# Patient Record
Sex: FEMALE | Race: WHITE | NOT HISPANIC OR LATINO | Employment: OTHER | URBAN - METROPOLITAN AREA
[De-identification: names, ages, dates, MRNs, and addresses within clinical notes are randomized per-mention and may not be internally consistent; named-entity substitution may affect disease eponyms.]

---

## 2018-09-20 ENCOUNTER — OFFICE VISIT (OUTPATIENT)
Dept: URGENT CARE | Facility: CLINIC | Age: 68
End: 2018-09-20
Payer: MEDICARE

## 2018-09-20 VITALS
OXYGEN SATURATION: 98 % | BODY MASS INDEX: 29.08 KG/M2 | SYSTOLIC BLOOD PRESSURE: 165 MMHG | HEART RATE: 79 BPM | DIASTOLIC BLOOD PRESSURE: 79 MMHG | RESPIRATION RATE: 18 BRPM | HEIGHT: 62 IN | WEIGHT: 158 LBS | TEMPERATURE: 97 F

## 2018-09-20 DIAGNOSIS — S52.501A CLOSED FRACTURE OF DISTAL END OF RIGHT RADIUS, UNSPECIFIED FRACTURE MORPHOLOGY, INITIAL ENCOUNTER: Primary | ICD-10-CM

## 2018-09-20 DIAGNOSIS — S52.614A CLOSED NONDISPLACED FRACTURE OF STYLOID PROCESS OF RIGHT ULNA, INITIAL ENCOUNTER: ICD-10-CM

## 2018-09-20 PROCEDURE — 99203 OFFICE O/P NEW LOW 30 MIN: CPT | Mod: S$GLB,,, | Performed by: NURSE PRACTITIONER

## 2018-09-20 RX ORDER — BUPROPION HYDROCHLORIDE 150 MG/1
150 TABLET ORAL DAILY
COMMUNITY

## 2018-09-20 RX ORDER — NAPROXEN 500 MG/1
500 TABLET ORAL 2 TIMES DAILY WITH MEALS
Qty: 20 TABLET | Refills: 0 | Status: SHIPPED | OUTPATIENT
Start: 2018-09-20 | End: 2018-09-30

## 2018-09-20 NOTE — PATIENT INSTRUCTIONS
Upper Extremity Fracture    You have a break (fracture) of the arm, wrist, or hand. This may be a small crack in the bone. Or it may be a major break, with the broken parts pushed out of position. Most fractures will heal without surgery. But you may need surgery if the bones are far out of place or if the break is near the elbow. Treatment is with a special sling called a shoulder immobilizer, or a splint or cast, depending on the type of fracture and where the fracture is. This fracture takes 4 to 6 weeks or longer to heal. The cast may need to be changed in 2 to 3 weeks as swelling goes down.  Home care  Follow these guidelines when caring for yourself:  · If you were given a shoulder immobilizer, leave it in place. This will support the injured arm at your side. This is the best position for bone healing. The shoulder immobilizer can be adjusted. If it becomes loose, adjust it so that your forearm is level with the ground (horizontal). Your hand should be level with your elbow.  · Put an ice pack on the injured area. Do this for 20 minutes every 1 to 2 hours the first day for pain relief. You can make an ice pack by wrapping a plastic bag of ice cubes in a thin towel. As the ice melts, be careful that the cast/splint/sling doesnt get wet. You can put the ice pack inside the sling and directly over the splint or cast. Continue using the ice pack 3 to 4 times a day for the next 2 days. Then use the ice pack as needed to ease pain and swelling.  · Keep the cast, splint, or sling completely dry at all times. Bathe with your cast, splint, or sling out of the water. Protect it with a large plastic bag, rubber-banded at the top end. If a fiberglass cast, splint, or sling gets wet, you can dry it with a hair dryer.  · You may use acetaminophen or ibuprofen to control pain, unless another pain medicine was prescribed. If you have chronic liver or kidney disease, talk with your healthcare provider before using these  medicines. Also talk with your provider if youve had a stomach ulcer or gastrointestinal bleeding.  · Dont put creams or objects under the cast if you have itching.  Follow-up care  Follow up with your healthcare provider, or as advised. This is to make sure the bone is healing the way it should.  X-rays may be taken. You will be told of any new findings that may affect your care.  When to seek medical advice  Call your health care provider right away if any of these occur:  · The cast or splint cracks  · The plaster cast or splint becomes wet or soft  · The fiberglass cast or splint stays wet for more than 24 hours  · Bad odor from the cast or wound fluid stains the cast  · Tightness or pain under the cast or splint gets worse  · Fingers become swollen, cold, blue, numb, or tingly  · You cant move your fingers  · Skin around cast or splint becomes red  · Fever of 100.4ºF (38ºC) or higher, or as directed by your healthcare provider  Date Last Reviewed: 2/1/2017 © 2000-2017 [employer]. [address] [place] [address]. All rights reserved. This information is not intended as a substitute for professional medical care. Always follow your healthcare professional's instructions.        Understanding a Distal Radius Fracture      A fracture is a broken bone. A fracture in the distal radius is a break in the lower end of the radius. This is the larger bone in the forearm. Because the break occurs near the wrist, it is often called a wrist fracture.    The bone may be cracked, or it may be broken into 2 or more pieces. The pieces of bone may be lined up or they may have moved out of place. Sometimes, the bone may break through the skin. Nearby nerves, tissues, and joints also may be damaged. Depending on the severity of the fracture, healing may take several months or longer.  What causes a distal radius fracture?  This type of fracture is most often caused from a fall on an outstretched hand.  It can also be caused from a blow, accident, or sports injury.  Symptoms of a distal radius fracture  Symptoms can include pain, swelling, and bruising. If the bone breaks through the skin, external bleeding can also occur. The wrist may look crooked, deformed, or bent. It may be hard to move or use the arm, wrist, and hand for normal tasks and activities.  Treating a distal radius fracture  Treatment depends on how serious the fracture is. If needed, the bone is put back into place. This may be done with or without surgery. If surgery is needed, the surgeon may use devices such as pins, plates, or screws to hold the bone together. You may need to wear a splint or cast for a month or longer to protect the bone and keep it in place during healing. Other treatments may be also used to help reduce symptoms or regain function. These include:  · Cold packs. Putting an ice pack on the injured area may help reduce swelling and pain.  · Raising the arm and wrist. Keeping the arm and wrist raised above heart level may help reduce swelling.  · Pain medicines. Taking prescription or over-the-counter pain medicines may help reduce pain and swelling.  · Exercises. Doing certain exercises at home or with a physical therapist can help restore strength, flexibility, and range of motion in your arm, wrist, and hand. In general, exercises are not started until after the splint or cast is removed.  Possible complications of a distal radius fracture  These can include:  · Poor healing of the bone  · Weakness, stiffness, or loss of range of motion in the arm, wrist, or hand  · Osteoarthritis in the wrist joint  When to call your healthcare provider  Call your healthcare provider right away if you have any of these:  · Fever of 100.4°F (38°C) or higher, or as directed  · Symptoms that dont get better with treatment, or get worse  · Numbness, coldness, or swelling in your arm, hand, or fingers  · Fingernails that turn blue or gray in  color  · A splint or cast that is damaged or feels too tight or loose  · New symptoms   Date Last Reviewed: 3/10/2016  © 2625-6993 [employer]. [address] [place] [address], [place] [address]. All rights reserved. This information is not intended as a substitute for professional medical care. Always follow your healthcare professional's instructions.      -Naproxen as needed for pain.  -Wear the splint until you follow up with Orthopedics..  -rest, ice, and compression.  -Urgent referral has been placed to follow up with orthopedics.  I have given you an [facility] doctor referral. If you don't hear from them in a few days call [phone]  -If symptoms worsen go to the ER.    Please follow up with your Primary care provider within 2-5 days if your signs and symptoms have not resolved or worsen.     If your condition worsens or fails to improve we recommend that you receive another evaluation at the emergency room immediately or contact your primary medical clinic to discuss your concerns.   You must understand that you have received an Urgent Care treatment only and that you may be released before all of your medical problems are known or treated. You, the patient, will arrange for follow up care as instructed.

## 2018-09-20 NOTE — PROGRESS NOTES
"Subjective:       Patient ID: Radha Aguiar is a 67 y.o. female.    Vitals:  height is 5' 2" (1.575 m) and weight is 71.7 kg (158 lb). Her temperature is 97.2 °F (36.2 °C). Her blood pressure is 165/79 (abnormal) and her pulse is 79. Her respiration is 18 and oxygen saturation is 98%.     Chief Complaint: Wrist Pain (right)    Patient was walking and  Stepped on a loose piece of slate and fell forward. Patient attempted to catch herself and put her right hand forward to catch herself (45min ago). Patient has not taken anything for pain. She is visiting from [place] and does not return home until Tuesday. Denies numbness and tingling. Her 3 daughters advised that she get the wrist checked.       Wrist Pain    The pain is present in the right wrist. This is a new problem. The current episode started today. There has been a history of trauma. The problem occurs constantly. The quality of the pain is described as burning and sharp. Associated symptoms include joint locking, joint swelling, a limited range of motion, numbness and stiffness. Pertinent negatives include no fever, inability to bear weight, itching or tingling. She has tried nothing for the symptoms.     Review of Systems   Constitution: Negative for fever, weakness and malaise/fatigue.   HENT: Negative for nosebleeds.    Cardiovascular: Negative for chest pain and syncope.   Respiratory: Negative for shortness of breath.    Skin: Negative for itching.   Musculoskeletal: Positive for stiffness. Negative for back pain, joint pain and neck pain.   Gastrointestinal: Negative for abdominal pain.   Genitourinary: Negative for hematuria.   Neurological: Positive for numbness. Negative for dizziness and tingling.   All other systems reviewed and are negative.      Objective:      Physical Exam   Constitutional: She is oriented to person, place, and time. She appears well-developed and well-nourished. She is cooperative.  Non-toxic appearance. She does not appear " ill. No distress.   HENT:   Head: Normocephalic and atraumatic. Head is without abrasion, without contusion and without laceration.   Right Ear: Hearing, tympanic membrane, external ear and ear canal normal. No hemotympanum.   Left Ear: Hearing, tympanic membrane, external ear and ear canal normal. No hemotympanum.   Nose: Nose normal. No mucosal edema, rhinorrhea or nasal deformity. No epistaxis. Right sinus exhibits no maxillary sinus tenderness and no frontal sinus tenderness. Left sinus exhibits no maxillary sinus tenderness and no frontal sinus tenderness.   Mouth/Throat: Uvula is midline, oropharynx is clear and moist and mucous membranes are normal. No trismus in the jaw. Normal dentition. No uvula swelling. No posterior oropharyngeal erythema.   Eyes: Conjunctivae, EOM and lids are normal. Pupils are equal, round, and reactive to light. Right eye exhibits no discharge. Left eye exhibits no discharge. No scleral icterus.   Sclera clear bilat   Neck: Trachea normal, normal range of motion, full passive range of motion without pain and phonation normal. Neck supple. No spinous process tenderness and no muscular tenderness present. No neck rigidity. No tracheal deviation present.   Cardiovascular: Normal rate, regular rhythm, normal heart sounds, intact distal pulses and normal pulses.   Pulses:       Radial pulses are 2+ on the right side, and 2+ on the left side.   Pulmonary/Chest: Effort normal and breath sounds normal. No respiratory distress.   Abdominal: Soft. Normal appearance and bowel sounds are normal. She exhibits no distension, no pulsatile midline mass and no mass. There is no tenderness.   Musculoskeletal: She exhibits no edema or deformity.        Right wrist: She exhibits tenderness, bony tenderness and swelling. She exhibits normal range of motion, no effusion, no crepitus, no deformity and no laceration.   Right rist medial and lateral swelling. Minimal tenderness present. Full rom with mild  pain. NVM intact.    Neurological: She is alert and oriented to person, place, and time. She has normal strength. No cranial nerve deficit or sensory deficit. She exhibits normal muscle tone. She displays no seizure activity. Coordination normal. GCS eye subscore is 4. GCS verbal subscore is 5. GCS motor subscore is 6.   Skin: Skin is warm, dry and intact. Capillary refill takes less than 2 seconds. No abrasion, no bruising, no burn, no ecchymosis and no laceration noted. She is not diaphoretic. No pallor.   Psychiatric: She has a normal mood and affect. Her speech is normal and behavior is normal. Judgment and thought content normal. Cognition and memory are normal.   Nursing note and vitals reviewed.      Placed in ulnar gutter black pre made splint prior to discharge. NVM intact pre and post application. Education given on the importance of wearing the splint until Ortho follow up. Message left for the  line to establish an Ortho referral urgently for the patient.     X-ray Wrist Complete Right    Result Date: 9/20/2018  EXAMINATION: XR WRIST COMPLETE 3 VIEWS RIGHT CLINICAL HISTORY: Unspecified injury of right wrist, hand and finger(s), initial encounter TECHNIQUE: PA, lateral, and oblique views of the right wrist were performed. COMPARISON: None FINDINGS: Comminuted fracture distal radius extending into the joint space.  There is some impaction of the fracture fragments.  There is a fracture of the ulnar styloid as well. There is some DISI of the lunate.  Degenerative change 1st carpometacarpal joint.     Fracture distal radius extending into the radiocarpal joint.  Fracture of the ulnar styloid noted.  There is some DISI of the lunate.  Soft tissue swelling noted. Electronically signed by: Agusto Mtz MD Date:    09/20/2018 Time:    17:14    Assessment:       1. Closed fracture of distal end of right radius, unspecified fracture morphology, initial encounter    2. Closed nondisplaced fracture of  styloid process of right ulna, initial encounter        Plan:         Closed fracture of distal end of right radius, unspecified fracture morphology, initial encounter  -     X-Ray Wrist Complete Right; Future; Expected date: 09/20/2018  -     Ambulatory referral to Orthopedics  [facility]  -     naproxen (NAPROSYN) 500 MG tablet; Take 1 tablet (500 mg total) by mouth 2 (two) times daily with meals. for 10 days  Dispense: 20 tablet; Refill: 0    Closed nondisplaced fracture of styloid process of right ulna, initial encounter  -     Ambulatory referral to Orthopedics  [facility] - [facility]  -     naproxen (NAPROSYN) 500 MG tablet; Take 1 tablet (500 mg total) by mouth 2 (two) times daily with meals. for 10 days  Dispense: 20 tablet; Refill: 0      Patient Instructions       Upper Extremity Fracture    You have a break (fracture) of the arm, wrist, or hand. This may be a small crack in the bone. Or it may be a major break, with the broken parts pushed out of position. Most fractures will heal without surgery. But you may need surgery if the bones are far out of place or if the break is near the elbow. Treatment is with a special sling called a shoulder immobilizer, or a splint or cast, depending on the type of fracture and where the fracture is. This fracture takes 4 to 6 weeks or longer to heal. The cast may need to be changed in 2 to 3 weeks as swelling goes down.  Home care  Follow these guidelines when caring for yourself:  · If you were given a shoulder immobilizer, leave it in place. This will support the injured arm at your side. This is the best position for bone healing. The shoulder immobilizer can be adjusted. If it becomes loose, adjust it so that your forearm is level with the ground (horizontal). Your hand should be level with your elbow.  · Put an ice pack on the injured area. Do this for 20 minutes every 1 to 2 hours the first day for pain relief. You can make an ice pack by wrapping a plastic bag  of ice cubes in a thin towel. As the ice melts, be careful that the cast/splint/sling doesnt get wet. You can put the ice pack inside the sling and directly over the splint or cast. Continue using the ice pack 3 to 4 times a day for the next 2 days. Then use the ice pack as needed to ease pain and swelling.  · Keep the cast, splint, or sling completely dry at all times. Bathe with your cast, splint, or sling out of the water. Protect it with a large plastic bag, rubber-banded at the top end. If a fiberglass cast, splint, or sling gets wet, you can dry it with a hair dryer.  · You may use acetaminophen or ibuprofen to control pain, unless another pain medicine was prescribed. If you have chronic liver or kidney disease, talk with your healthcare provider before using these medicines. Also talk with your provider if youve had a stomach ulcer or gastrointestinal bleeding.  · Dont put creams or objects under the cast if you have itching.  Follow-up care  Follow up with your healthcare provider, or as advised. This is to make sure the bone is healing the way it should.  X-rays may be taken. You will be told of any new findings that may affect your care.  When to seek medical advice  Call your health care provider right away if any of these occur:  · The cast or splint cracks  · The plaster cast or splint becomes wet or soft  · The fiberglass cast or splint stays wet for more than 24 hours  · Bad odor from the cast or wound fluid stains the cast  · Tightness or pain under the cast or splint gets worse  · Fingers become swollen, cold, blue, numb, or tingly  · You cant move your fingers  · Skin around cast or splint becomes red  · Fever of 100.4ºF (38ºC) or higher, or as directed by your healthcare provider  Date Last Reviewed: 2/1/2017  © 6437-1708 [employer]. [address] [place] [address], [place] [address]. All rights reserved. This information is not intended as a substitute for professional medical care.  Always follow your healthcare professional's instructions.        Understanding a Distal Radius Fracture      A fracture is a broken bone. A fracture in the distal radius is a break in the lower end of the radius. This is the larger bone in the forearm. Because the break occurs near the wrist, it is often called a wrist fracture.    The bone may be cracked, or it may be broken into 2 or more pieces. The pieces of bone may be lined up or they may have moved out of place. Sometimes, the bone may break through the skin. Nearby nerves, tissues, and joints also may be damaged. Depending on the severity of the fracture, healing may take several months or longer.  What causes a distal radius fracture?  This type of fracture is most often caused from a fall on an outstretched hand. It can also be caused from a blow, accident, or sports injury.  Symptoms of a distal radius fracture  Symptoms can include pain, swelling, and bruising. If the bone breaks through the skin, external bleeding can also occur. The wrist may look crooked, deformed, or bent. It may be hard to move or use the arm, wrist, and hand for normal tasks and activities.  Treating a distal radius fracture  Treatment depends on how serious the fracture is. If needed, the bone is put back into place. This may be done with or without surgery. If surgery is needed, the surgeon may use devices such as pins, plates, or screws to hold the bone together. You may need to wear a splint or cast for a month or longer to protect the bone and keep it in place during healing. Other treatments may be also used to help reduce symptoms or regain function. These include:  · Cold packs. Putting an ice pack on the injured area may help reduce swelling and pain.  · Raising the arm and wrist. Keeping the arm and wrist raised above heart level may help reduce swelling.  · Pain medicines. Taking prescription or over-the-counter pain medicines may help reduce pain and  swelling.  · Exercises. Doing certain exercises at home or with a physical therapist can help restore strength, flexibility, and range of motion in your arm, wrist, and hand. In general, exercises are not started until after the splint or cast is removed.  Possible complications of a distal radius fracture  These can include:  · Poor healing of the bone  · Weakness, stiffness, or loss of range of motion in the arm, wrist, or hand  · Osteoarthritis in the wrist joint  When to call your healthcare provider  Call your healthcare provider right away if you have any of these:  · Fever of 100.4°F (38°C) or higher, or as directed  · Symptoms that dont get better with treatment, or get worse  · Numbness, coldness, or swelling in your arm, hand, or fingers  · Fingernails that turn blue or gray in color  · A splint or cast that is damaged or feels too tight or loose  · New symptoms   Date Last Reviewed: 3/10/2016  © 0340-9439 [employer]. [address] [place] [address]. All rights reserved. This information is not intended as a substitute for professional medical care. Always follow your healthcare professional's instructions.      -Naproxen as needed for pain.  -Wear the splint until you follow up with Orthopedics..  -rest, ice, and compression.  -Urgent referral has been placed to follow up with orthopedics.  I have given you an [facility] doctor referral. If you don't hear from them in a few days call [phone]  -If symptoms worsen go to the ER.    Please follow up with your Primary care provider within 2-5 days if your signs and symptoms have not resolved or worsen.     If your condition worsens or fails to improve we recommend that you receive another evaluation at the emergency room immediately or contact your primary medical clinic to discuss your concerns.   You must understand that you have received an Urgent Care treatment only and that you may be released before all of your medical  problems are known or treated. You, the patient, will arrange for follow up care as instructed.

## 2018-09-21 ENCOUNTER — OFFICE VISIT (OUTPATIENT)
Dept: ORTHOPEDICS | Facility: CLINIC | Age: 68
End: 2018-09-21
Payer: MEDICARE

## 2018-09-21 ENCOUNTER — DOCUMENTATION ONLY (OUTPATIENT)
Dept: ORTHOPEDICS | Facility: CLINIC | Age: 68
End: 2018-09-21

## 2018-09-21 VITALS
HEART RATE: 48 BPM | HEIGHT: 62 IN | DIASTOLIC BLOOD PRESSURE: 78 MMHG | WEIGHT: 158 LBS | SYSTOLIC BLOOD PRESSURE: 122 MMHG | BODY MASS INDEX: 29.08 KG/M2

## 2018-09-21 DIAGNOSIS — S52.501A CLOSED FRACTURE OF DISTAL ENDS OF RIGHT RADIUS AND ULNA, INITIAL ENCOUNTER: Primary | ICD-10-CM

## 2018-09-21 DIAGNOSIS — S52.601A CLOSED FRACTURE OF DISTAL ENDS OF RIGHT RADIUS AND ULNA, INITIAL ENCOUNTER: Primary | ICD-10-CM

## 2018-09-21 PROCEDURE — 99203 OFFICE O/P NEW LOW 30 MIN: CPT | Mod: 25,S$PBB,, | Performed by: PHYSICIAN ASSISTANT

## 2018-09-21 PROCEDURE — 1100F PTFALLS ASSESS-DOCD GE2>/YR: CPT | Mod: CPTII,,, | Performed by: PHYSICIAN ASSISTANT

## 2018-09-21 PROCEDURE — 99999 PR PBB SHADOW E&M-EST. PATIENT-LVL III: CPT | Mod: PBBFAC,,, | Performed by: PHYSICIAN ASSISTANT

## 2018-09-21 PROCEDURE — 99213 OFFICE O/P EST LOW 20 MIN: CPT | Mod: PBBFAC | Performed by: PHYSICIAN ASSISTANT

## 2018-09-21 PROCEDURE — 3288F FALL RISK ASSESSMENT DOCD: CPT | Mod: CPTII,,, | Performed by: PHYSICIAN ASSISTANT

## 2018-09-21 NOTE — PROGRESS NOTES
"Subjective:      Patient ID: Radha Aguiar is a 67 y.o. female.    Chief Complaint: Pain of the Right Wrist      HPI  Radha Aguiar is a  67 y.o. female presenting today for right distal radius and ulnar styloid fractures. Injury occurred yesterday, 9/20/18. Pt tripped and fell onto the right wrist. She was then seen in urgent care where xrays revealed fracture. Pt was placed into a removable brace. She notes pain in the wrist. Denies numbness/tingling. She was given Naproxen at Urgent care, requests something stronger. Pt is from [place] and is here on vacation until 9/25/18.       Review of patient's allergies indicates:  No Known Allergies      Current Outpatient Medications   Medication Sig Dispense Refill    buPROPion (WELLBUTRIN XL) 150 MG TB24 tablet Take 150 mg by mouth once daily.      naproxen (NAPROSYN) 500 MG tablet Take 1 tablet (500 mg total) by mouth 2 (two) times daily with meals. for 10 days 20 tablet 0     No current facility-administered medications for this visit.        Past Medical History:   Diagnosis Date    Depression        Past Surgical History:   Procedure Laterality Date    LEG SURGERY         Review of Systems:  Constitutional: Negative for chills and fever.   Respiratory: Negative for cough and shortness of breath.    Gastrointestinal: Negative for nausea and vomiting.   Skin: Negative for rash.   Neurological: Negative for dizziness and headaches.   Psychiatric/Behavioral: Negative for depression.   MSK as in HPI       OBJECTIVE:     PHYSICAL EXAM:  /78   Pulse (!) 48   Ht 5' 2" (1.575 m)   Wt 71.7 kg (158 lb)   BMI 28.90 kg/m²     GEN:  NAD, well-developed, well-groomed.  NEURO: Awake, alert, and oriented. Normal attention and concentration.    PSYCH: Normal mood and affect. Behavior is normal.  HEENT: No cervical lymphadenopathy noted.  CARDIOVASCULAR: Radial pulses 2+ bilaterally. No LE edema noted.  PULMONARY: Breath sounds normal. No respiratory distress.  SKIN: " Intact, no rashes.      MSK:   RUE:  Brace removed. Edema and ecchymosis noted around wrist and forearm. Fair finger motion. AIN/PIN/Radial/Median/Ulnar Nerves assessed in isolation without deficit. Radial & Ulnar arteries palpated 2+. Capillary Refill <3s. Well padded sugartong plaster splint applied.       RADIOGRAPHS:  Xray right wrist 9/20/18  FINDINGS:  Comminuted fracture distal radius extending into the joint space.  There is some impaction of the fracture fragments.  There is a fracture of the ulnar styloid as well. There is some DISI of the lunate.  Degenerative change 1st carpometacarpal joint.    Comments: I have personally reviewed the imaging and I agree with the above radiologist's report.    ASSESSMENT/PLAN:       ICD-10-CM ICD-9-CM   1. Closed fracture of distal ends of right radius and ulna, initial encounter S52.501A 813.44    S52.601A       Plan:   -discussed treatment options with pt. Informed surgery is recommended. However, because [place] and will [place] in a few days, I have recommended she call and schedule an appt with a local orthopedist for as soon as she returns.   -placed in R sugartong splint   -sling given   -scripts for Ultram for pain during the day and Norco at night given   -RTC as needed, f/u in [place]        The patient indicates understanding of these issues and agrees to the plan.    Savanah Wilson PA-C  [facility]   [facility] [place]

## 2018-09-24 ENCOUNTER — TELEPHONE (OUTPATIENT)
Dept: URGENT CARE | Facility: CLINIC | Age: 68
End: 2018-09-24